# Patient Record
Sex: FEMALE | Race: WHITE | NOT HISPANIC OR LATINO | Employment: OTHER | URBAN - METROPOLITAN AREA
[De-identification: names, ages, dates, MRNs, and addresses within clinical notes are randomized per-mention and may not be internally consistent; named-entity substitution may affect disease eponyms.]

---

## 2020-07-01 PROBLEM — F32.A DEPRESSION WITH SUICIDAL IDEATION: Status: ACTIVE | Noted: 2020-07-01

## 2020-07-01 PROBLEM — R45.851 DEPRESSION WITH SUICIDAL IDEATION: Status: ACTIVE | Noted: 2020-07-01

## 2020-07-09 PROBLEM — R63.4 WEIGHT LOSS, UNINTENTIONAL: Status: ACTIVE | Noted: 2020-07-09

## 2020-07-15 PROBLEM — F22 DELUSIONAL DISORDER: Status: ACTIVE | Noted: 2020-07-15

## 2020-07-23 ENCOUNTER — OFFICE VISIT (OUTPATIENT)
Dept: NEUROLOGY | Facility: CLINIC | Age: 70
End: 2020-07-23
Payer: MEDICARE

## 2020-07-23 ENCOUNTER — LAB VISIT (OUTPATIENT)
Dept: LAB | Facility: HOSPITAL | Age: 70
End: 2020-07-23
Attending: PSYCHIATRY & NEUROLOGY
Payer: MEDICARE

## 2020-07-23 VITALS
HEART RATE: 62 BPM | HEIGHT: 65 IN | SYSTOLIC BLOOD PRESSURE: 118 MMHG | TEMPERATURE: 98 F | WEIGHT: 102.75 LBS | RESPIRATION RATE: 16 BRPM | BODY MASS INDEX: 17.12 KG/M2 | DIASTOLIC BLOOD PRESSURE: 64 MMHG

## 2020-07-23 DIAGNOSIS — R41.3 MEMORY LOSS: Primary | ICD-10-CM

## 2020-07-23 DIAGNOSIS — R41.3 MEMORY LOSS: ICD-10-CM

## 2020-07-23 DIAGNOSIS — F22 DELUSIONS: ICD-10-CM

## 2020-07-23 DIAGNOSIS — R44.3 HALLUCINATIONS: ICD-10-CM

## 2020-07-23 PROCEDURE — 3008F PR BODY MASS INDEX (BMI) DOCUMENTED: ICD-10-PCS | Mod: CPTII,S$GLB,, | Performed by: PSYCHIATRY & NEUROLOGY

## 2020-07-23 PROCEDURE — 86592 SYPHILIS TEST NON-TREP QUAL: CPT

## 2020-07-23 PROCEDURE — 36415 COLL VENOUS BLD VENIPUNCTURE: CPT

## 2020-07-23 PROCEDURE — 1126F PR PAIN SEVERITY QUANTIFIED, NO PAIN PRESENT: ICD-10-PCS | Mod: S$GLB,,, | Performed by: PSYCHIATRY & NEUROLOGY

## 2020-07-23 PROCEDURE — 3008F BODY MASS INDEX DOCD: CPT | Mod: CPTII,S$GLB,, | Performed by: PSYCHIATRY & NEUROLOGY

## 2020-07-23 PROCEDURE — 1126F AMNT PAIN NOTED NONE PRSNT: CPT | Mod: S$GLB,,, | Performed by: PSYCHIATRY & NEUROLOGY

## 2020-07-23 PROCEDURE — 82607 VITAMIN B-12: CPT

## 2020-07-23 PROCEDURE — 99999 PR PBB SHADOW E&M-EST. PATIENT-LVL IV: ICD-10-PCS | Mod: PBBFAC,,, | Performed by: PSYCHIATRY & NEUROLOGY

## 2020-07-23 PROCEDURE — 99204 OFFICE O/P NEW MOD 45 MIN: CPT | Mod: S$GLB,,, | Performed by: PSYCHIATRY & NEUROLOGY

## 2020-07-23 PROCEDURE — 1159F MED LIST DOCD IN RCRD: CPT | Mod: S$GLB,,, | Performed by: PSYCHIATRY & NEUROLOGY

## 2020-07-23 PROCEDURE — 99999 PR PBB SHADOW E&M-EST. PATIENT-LVL IV: CPT | Mod: PBBFAC,,, | Performed by: PSYCHIATRY & NEUROLOGY

## 2020-07-23 PROCEDURE — 99204 PR OFFICE/OUTPT VISIT, NEW, LEVL IV, 45-59 MIN: ICD-10-PCS | Mod: S$GLB,,, | Performed by: PSYCHIATRY & NEUROLOGY

## 2020-07-23 PROCEDURE — 1159F PR MEDICATION LIST DOCUMENTED IN MEDICAL RECORD: ICD-10-PCS | Mod: S$GLB,,, | Performed by: PSYCHIATRY & NEUROLOGY

## 2020-07-23 NOTE — LETTER
July 23, 2020      Lorraine Doyle MD  [address] [place] [address]           [facility]  [address] [address]  Phone: [phone]  Fax: [phone]          Patient: Libertad Robertson   MR Number: [id number]   YOB: 1950   Date of Visit: 7/23/2020       Dear Dr. Lorraine Doyle:    Thank you for referring Libertad Robertson to me for evaluation. Attached you will find relevant portions of my assessment and plan of care.    If you have questions, please do not hesitate to call me. I look forward to following Libertad Robertson along with you.    Sincerely,    Glen Cardozo MD    Enclosure  CC:  No Recipients    If you would like to receive this communication electronically, please contact [email or web address] or [phone] to request more information on [employer] access.    For providers and/or their staff who would like to refer a patient to [facility], please contact us through our one-stop-shop provider referral line, [facility], at [phone].    If you feel you have received this communication in error or would no longer like to receive these types of communications, please e-mail [email or web address]

## 2020-07-23 NOTE — PROGRESS NOTES
"Consult from Dr Doyle    HPI: Libertad Robertson is a 70 y.o. female with a history of memory problems  A friend here states she has been forgetful for over the past year- short term memory loss, worsening. She has had difficulty managing her banking prior as well as her phone bills.   She was having delusions that people were taking her money and food (hallucinating this/ no one was there per friend).  Has been inpatient in psychiatry for delusions which have been present for a year as well/ same as memory loss  No tremors noted. No falls.   Reviewed chart from [facility].  Her landlord reported she was having some delusions.  Inpatient she was started on Respirdal and aricept 10mg    Her delusions seem improved and no longer hallucinating on Respirdal    No prior history of mental health issues per patient  Patient' s mom had memory. Patient denies stroke or head trauma history.    Recently treated for "Gastroenteritis"- eating again more recently. Has had nausea and some diarrhea and nausea persists  Patient lives alone. Has no children.Family lives in a different town/ she is estranged from then long term.     Patient has a college degree  Patient is here with a friend from her former work who assists with meds, but patient does not have full assistance.     Not driving    Review of Systems   Unable to perform ROS: Dementia         I have reviewed all of this patient's past medical and surgical histories as well as family and social histories and active allergies and medications as documented in the electronic medical record.        Exam:  Gen Appearance, well developed/nourished in no apparent distress  CV: 2+ distal pulses with no edema or swelling  Neuro:  MS: Awake, alert, oriented to place, person, not  to time, and fully to situation. Sustains attention. Recent recall is easily forgotten/remote memory intact, Language is full to spontaneous speech/repetition/naming/comprehension. Fund of " Knowledge is full  Can't name current president (States she can picture him but can't think of the name- without hint  Has insight into memory loss now and hallucinations she was having  CN: Optic discs are flat with normal vasculature, PERRL, Extraoccular movements and visual fields are full. Normal facial sensation and strength, Hearing symmetric, Tongue and Palate are midline and strong. Shoulder Shrug symmetric and strong.  Motor: Normal bulk, tone, no abnormal movements. 5/5 strength bilateral upper/lower extremities with 1+ reflexes and no clonus  Sensory: symmetric to light touch, pain, temp, and vibration,Romberg negative  Cerebellar: Finger-nose,Heal-shin, Rapid alternating movements intact  Gait: Normal stance, no ataxia    Imagin2020 CT head  Labs: 2020CBC, CMP, TSH with PCP reviewed/ unremarkable    2020 UDS negative    Assessment/Plan: Libertad Robertson is a 70 y.o. female with a year of memory loss, executive dysfunction, and some psychiatry symptoms of hallucinations and delusions.   I recommend:   1. MRI brain  2. B12, RPR  3. Neuropsychological testing. AD, Frontal dementia and DLB all in differential diagnosis  -Recently s/p inpatient stay and [facility] for delusions. Respirdal is helping with delusions/ hallucinations  4. Reduce aricept 10mg recently started to 1/2 tablet at night given her complaint of nausea/diarrhea more recently. Stop use if this persists more than week  5. Consult home health needed for social working (needs home safety evaluation and help with long term planning), home nursing/ PT/OT (deconditioned from recent GI symptoms)  -she has no children, is estranged from family. Discussed long term planning. If hallucinations/ delusions return, she will 24 hour supervision as reviewed  -No driving . A friend here today has been helping her  RTC 6 weeks, 6 months    CC:  Dr Doyle

## 2020-07-24 LAB
RPR SER QL: NORMAL
VIT B12 SERPL-MCNC: >2000 PG/ML (ref 210–950)

## 2020-07-29 ENCOUNTER — TELEPHONE (OUTPATIENT)
Dept: NEUROLOGY | Facility: CLINIC | Age: 70
End: 2020-07-29

## 2020-07-29 NOTE — TELEPHONE ENCOUNTER
Received documentation from [facility] that they are not in network with the patients insurance for memory testing. I spoke with the patients caregiver and explained the situation and gave the phone number to [facility] to contact them to try to schedule there. Caregiver stated that she did not think the patient would go forward with it but would call us to notify if they did decide to schedule.

## 2020-07-30 ENCOUNTER — OFFICE VISIT (OUTPATIENT)
Dept: NEUROLOGY | Facility: CLINIC | Age: 70
End: 2020-07-30
Payer: MEDICARE

## 2020-07-30 VITALS
WEIGHT: 101.5 LBS | RESPIRATION RATE: 15 BRPM | BODY MASS INDEX: 16.91 KG/M2 | DIASTOLIC BLOOD PRESSURE: 76 MMHG | HEIGHT: 65 IN | HEART RATE: 65 BPM | SYSTOLIC BLOOD PRESSURE: 114 MMHG

## 2020-07-30 DIAGNOSIS — R41.3 MEMORY LOSS: Primary | ICD-10-CM

## 2020-07-30 DIAGNOSIS — R44.3 HALLUCINATIONS: ICD-10-CM

## 2020-07-30 DIAGNOSIS — Z74.8 ASSISTANCE NEEDED WITH TRANSPORTATION: ICD-10-CM

## 2020-07-30 DIAGNOSIS — F22 DELUSIONS: ICD-10-CM

## 2020-07-30 PROCEDURE — 99999 PR PBB SHADOW E&M-EST. PATIENT-LVL III: ICD-10-PCS | Mod: PBBFAC,,, | Performed by: NURSE PRACTITIONER

## 2020-07-30 PROCEDURE — 1126F PR PAIN SEVERITY QUANTIFIED, NO PAIN PRESENT: ICD-10-PCS | Mod: S$GLB,,, | Performed by: NURSE PRACTITIONER

## 2020-07-30 PROCEDURE — 1101F PT FALLS ASSESS-DOCD LE1/YR: CPT | Mod: CPTII,S$GLB,, | Performed by: NURSE PRACTITIONER

## 2020-07-30 PROCEDURE — 99214 PR OFFICE/OUTPT VISIT, EST, LEVL IV, 30-39 MIN: ICD-10-PCS | Mod: S$GLB,,, | Performed by: NURSE PRACTITIONER

## 2020-07-30 PROCEDURE — 1101F PR PT FALLS ASSESS DOC 0-1 FALLS W/OUT INJ PAST YR: ICD-10-PCS | Mod: CPTII,S$GLB,, | Performed by: NURSE PRACTITIONER

## 2020-07-30 PROCEDURE — 99214 OFFICE O/P EST MOD 30 MIN: CPT | Mod: S$GLB,,, | Performed by: NURSE PRACTITIONER

## 2020-07-30 PROCEDURE — 1126F AMNT PAIN NOTED NONE PRSNT: CPT | Mod: S$GLB,,, | Performed by: NURSE PRACTITIONER

## 2020-07-30 PROCEDURE — 99999 PR PBB SHADOW E&M-EST. PATIENT-LVL III: CPT | Mod: PBBFAC,,, | Performed by: NURSE PRACTITIONER

## 2020-07-30 PROCEDURE — 3008F PR BODY MASS INDEX (BMI) DOCUMENTED: ICD-10-PCS | Mod: CPTII,S$GLB,, | Performed by: NURSE PRACTITIONER

## 2020-07-30 PROCEDURE — 3008F BODY MASS INDEX DOCD: CPT | Mod: CPTII,S$GLB,, | Performed by: NURSE PRACTITIONER

## 2020-07-30 PROCEDURE — 1159F PR MEDICATION LIST DOCUMENTED IN MEDICAL RECORD: ICD-10-PCS | Mod: S$GLB,,, | Performed by: NURSE PRACTITIONER

## 2020-07-30 PROCEDURE — 1159F MED LIST DOCD IN RCRD: CPT | Mod: S$GLB,,, | Performed by: NURSE PRACTITIONER

## 2020-07-30 RX ORDER — ONDANSETRON 4 MG/1
4 TABLET, FILM COATED ORAL EVERY 8 HOURS PRN
COMMUNITY
End: 2020-07-30 | Stop reason: ALTCHOICE

## 2020-07-30 NOTE — PROGRESS NOTES
HPI: Libertad Robertson is a 70 y.o. female with a year of memory loss, executive dysfunction, and some psychiatry symptoms of hallucinations and delusions. Inpatient Psych admit to [facility].     She presents today for a follow up visit. She was evaluated by Dr. Cardozo one week ago for her memory complaints. This was her initial consultation. Neuropsych testing was ordered. There are issues with her having this done, as she has very little social support. Referral placed to [facility], due to insurance restrictions with a local provider. Home health was ordered at her last visit with social work consult. They are trying to arrange transportation with [place] to help with this.     Her friend accompanies her today. She has concerns whether or not patient can be relied upon to self administer medications.     Risperdal does help her mood. No hallucinations on this. Delusions are improved.     Aricept reduced by half at last visit, due to nausea/vomiting, and weight loss. This is much improved since last visit one week ago, but she continues with abdominal pain, as well as poor appetite (but improved).     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     She does not drive. She is estranged from her family.     Review of Systems   Unable to perform ROS: Dementia         I have reviewed all of this patient's past medical and surgical histories as well as family and social histories and active allergies and medications as documented in the electronic medical record.    Exam:  Gen Appearance, well developed/nourished in no apparent distress  CV: 2+ distal pulses with no edema or swelling  Neuro:  MS: Awake, alert, oriented to place, person, not  to time, and fully to situation. Sustains attention. Recent recall is easily forgotten/remote memory intact, Language is full to spontaneous speech/repetition/naming/comprehension. Fund of Knowledge is full  Can't name current president (States she  can picture him but can't think of the name- without hint)  Has insight into memory loss now and hallucinations she was having  CN: Optic discs are flat with normal vasculature, PERRL, Extraoccular movements and visual fields are full. Normal facial sensation and strength, Hearing symmetric, Tongue and Palate are midline and strong. Shoulder Shrug symmetric and strong.  Motor: Normal bulk, tone, no abnormal movements. 5/5 strength bilateral upper/lower extremities with 1+ reflexes and no clonus  Sensory: symmetric to light touch, pain, temp, and vibration,Romberg negative  Cerebellar: Finger-nose,Heal-shin, Rapid alternating movements intact  Gait: Normal stance, no ataxia    Imagin/2020 CT head    Labs:   2020 CBC, CMP, TSH with PCP reviewed/ unremarkable  2020 RPR and B12 WNL    2020 UDS negative    Assessment/Plan: Libertad Robertson is a 70 y.o. female with a year of memory loss, executive dysfunction, and some psychiatry symptoms of hallucinations and delusions.     I recommend:   1. Continue with home health with social work consult.   -Neuropsych testing is greatly needed to quantify her memory loss, and help determine if her clinical picture is more fitting of a psychiatric disorder or a type of dementia. Alzheimers, frontotemporal lobe dementia, and Lewy Body dementia are all in the differential diagnosis.   -patient has no transportation and really no significant social support. Social work consult greatly needed here.   --she has no children, is estranged from family. Discussed long term planning. If hallucinations/ delusions return, she will 24 hour supervision as reviewed  2. Stop Aricept. Nausea/vomiting resolved with reducing dose, but she continues with poor appetite and abdominal pain, which could be related.   -consider addition of Namenda pending Neuropsych testing results.   -discussed with friend that these medications do not have a significant impact on memory and that stopping  Aricept would not cause any abrupt changes in mental status.   3. She is pending MRI Brain this week.   4. Recently s/p inpatient stay and [facility] for delusions. Risperdal is helping with delusions/ hallucinations  5. Consult home health needed for social working (needs home safety evaluation and help with long term planning), home nursing/ PT/OT (deconditioned from recent GI symptoms)  6. No driving.     RTC 6 weeks, RTC 6 months    CC:    Dr. Doyle  The Medical Team

## 2020-07-31 ENCOUNTER — HOSPITAL ENCOUNTER (OUTPATIENT)
Dept: RADIOLOGY | Facility: HOSPITAL | Age: 70
Discharge: HOME | End: 2020-07-31
Attending: PSYCHIATRY & NEUROLOGY
Payer: MEDICARE

## 2020-07-31 DIAGNOSIS — R41.3 MEMORY LOSS: ICD-10-CM

## 2020-07-31 DIAGNOSIS — R44.3 HALLUCINATIONS: ICD-10-CM

## 2020-07-31 DIAGNOSIS — F22 DELUSIONS: ICD-10-CM

## 2020-07-31 PROCEDURE — 70551 MRI BRAIN STEM W/O DYE: CPT | Mod: 26,,, | Performed by: RADIOLOGY

## 2020-07-31 PROCEDURE — 70551 MRI BRAIN WITHOUT CONTRAST: ICD-10-PCS | Mod: 26,,, | Performed by: RADIOLOGY

## 2020-07-31 PROCEDURE — 70551 MRI BRAIN STEM W/O DYE: CPT | Mod: TC

## 2020-08-07 ENCOUNTER — EXTERNAL HOME HEALTH (OUTPATIENT)
Dept: HOME HEALTH SERVICES | Facility: HOSPITAL | Age: 70
End: 2020-08-07

## 2020-08-17 ENCOUNTER — DOCUMENT SCAN (OUTPATIENT)
Dept: HOME HEALTH SERVICES | Facility: HOSPITAL | Age: 70
End: 2020-08-17
Payer: MEDICARE

## 2020-08-20 ENCOUNTER — SOCIAL WORK (OUTPATIENT)
Dept: CASE MANAGEMENT | Facility: HOSPITAL | Age: 70
End: 2020-08-20

## 2020-08-20 NOTE — PROGRESS NOTES
spoke to Sheryl, a representative with [employer], regarding transportation assistance for patient.  was informed that patient does not have transportation services as part of her plan and is not eligible for assistance.      spoke to the [facility] regarding potential transportation assistance for patient.  was informed that they only transport within [facility].     Clinic notified of information.

## 2020-08-27 ENCOUNTER — TELEPHONE (OUTPATIENT)
Dept: NEUROLOGY | Facility: CLINIC | Age: 70
End: 2020-08-27

## 2020-08-27 NOTE — TELEPHONE ENCOUNTER
Spoke with family friend and patient was scheduled for 30 min clinic visit. Patient's f/u with NP was canceled.

## 2020-09-15 ENCOUNTER — TELEPHONE (OUTPATIENT)
Dept: NEUROLOGY | Facility: CLINIC | Age: 70
End: 2020-09-15

## 2020-09-15 ENCOUNTER — OFFICE VISIT (OUTPATIENT)
Dept: NEUROLOGY | Facility: CLINIC | Age: 70
End: 2020-09-15
Payer: MEDICARE

## 2020-09-15 VITALS
TEMPERATURE: 97 F | RESPIRATION RATE: 14 BRPM | BODY MASS INDEX: 17.33 KG/M2 | HEART RATE: 48 BPM | SYSTOLIC BLOOD PRESSURE: 164 MMHG | WEIGHT: 107.81 LBS | HEIGHT: 66 IN | DIASTOLIC BLOOD PRESSURE: 74 MMHG

## 2020-09-15 DIAGNOSIS — G30.1 LATE ONSET ALZHEIMER'S DISEASE WITH BEHAVIORAL DISTURBANCE: Primary | ICD-10-CM

## 2020-09-15 DIAGNOSIS — F02.818 LATE ONSET ALZHEIMER'S DISEASE WITH BEHAVIORAL DISTURBANCE: Primary | ICD-10-CM

## 2020-09-15 PROCEDURE — 1126F PR PAIN SEVERITY QUANTIFIED, NO PAIN PRESENT: ICD-10-PCS | Mod: S$GLB,,, | Performed by: PSYCHIATRY & NEUROLOGY

## 2020-09-15 PROCEDURE — 1159F PR MEDICATION LIST DOCUMENTED IN MEDICAL RECORD: ICD-10-PCS | Mod: S$GLB,,, | Performed by: PSYCHIATRY & NEUROLOGY

## 2020-09-15 PROCEDURE — 1126F AMNT PAIN NOTED NONE PRSNT: CPT | Mod: S$GLB,,, | Performed by: PSYCHIATRY & NEUROLOGY

## 2020-09-15 PROCEDURE — 1101F PT FALLS ASSESS-DOCD LE1/YR: CPT | Mod: CPTII,S$GLB,, | Performed by: PSYCHIATRY & NEUROLOGY

## 2020-09-15 PROCEDURE — 99999 PR PBB SHADOW E&M-EST. PATIENT-LVL III: CPT | Mod: PBBFAC,,, | Performed by: PSYCHIATRY & NEUROLOGY

## 2020-09-15 PROCEDURE — 99999 PR PBB SHADOW E&M-EST. PATIENT-LVL III: ICD-10-PCS | Mod: PBBFAC,,, | Performed by: PSYCHIATRY & NEUROLOGY

## 2020-09-15 PROCEDURE — 99215 OFFICE O/P EST HI 40 MIN: CPT | Mod: S$GLB,,, | Performed by: PSYCHIATRY & NEUROLOGY

## 2020-09-15 PROCEDURE — 99215 PR OFFICE/OUTPT VISIT, EST, LEVL V, 40-54 MIN: ICD-10-PCS | Mod: S$GLB,,, | Performed by: PSYCHIATRY & NEUROLOGY

## 2020-09-15 PROCEDURE — 3008F PR BODY MASS INDEX (BMI) DOCUMENTED: ICD-10-PCS | Mod: CPTII,S$GLB,, | Performed by: PSYCHIATRY & NEUROLOGY

## 2020-09-15 PROCEDURE — 3008F BODY MASS INDEX DOCD: CPT | Mod: CPTII,S$GLB,, | Performed by: PSYCHIATRY & NEUROLOGY

## 2020-09-15 PROCEDURE — 1159F MED LIST DOCD IN RCRD: CPT | Mod: S$GLB,,, | Performed by: PSYCHIATRY & NEUROLOGY

## 2020-09-15 PROCEDURE — 1101F PR PT FALLS ASSESS DOC 0-1 FALLS W/OUT INJ PAST YR: ICD-10-PCS | Mod: CPTII,S$GLB,, | Performed by: PSYCHIATRY & NEUROLOGY

## 2020-09-15 RX ORDER — MEMANTINE HYDROCHLORIDE 5 MG/1
TABLET ORAL
Qty: 60 TABLET | Refills: 11 | Status: SHIPPED | OUTPATIENT
Start: 2020-09-15

## 2020-09-15 NOTE — PROGRESS NOTES
Consult from Dr Doyle    HPI: Libertad Robertson is a 70 y.o. female     Here for follow up   I asked her to come in for more extensive testing of her memory as she can't get to neuropsychological testing which is not in network locally, can't get transportation to out of town testings.    She was an hour late for this appointment.     No longer in home health, was discharged    MEDS are supervised by her friend who checks and reminds her about them daily  Not driving  Friend helps her with bills  Not clearly having hallucinations.    She is able to do her own laundry. Friend has to make sure she has her correct groceries and food.      Patient has a college degree  Patient is here with a friend from her former work who assists with meds, but patient does not have full assistance.           Review of Systems   Unable to perform ROS: Dementia         I have reviewed all of this patient's past medical and surgical histories as well as family and social histories and active allergies and medications as documented in the electronic medical record.        Exam:  Gen Appearance, well developed/nourished in no apparent distress  CV: 2+ distal pulses with no edema or swelling  Neuro:  MS: See [facility] today  She has no insight into her memory loss  CN: Optic discs are flat with normal vasculature, PERRL, Extraoccular movements and visual fields are full. Normal facial sensation and strength, Hearing symmetric, Tongue and Palate are midline and strong. Shoulder Shrug symmetric and strong.  Motor: Normal bulk, tone, no abnormal movements. 5/5 strength bilateral upper/lower extremities with 1+ reflexes and no clonus  Sensory: symmetric to light touch, pain, temp, and vibration,Romberg negative  Cerebellar: Finger-nose,Heal-shin, Rapid alternating movements intact  Gait: Normal stance, no ataxia    Imagin2020 CT head   MRI brain noncontributory   Labs: 2020CBC, CMP, TSH with PCP reviewed/ unremarkable   B12, RPR  unremarkable    7/2020 UDS negative    Assessment/Plan: Libertad Robertson is a 70 y.o. female with a year of memory loss, executive dysfunction, and some psychiatry symptoms of hallucinations and delusions.   I recommend:   1. MRI brain and labs noncontributory   3. Neuropsychological testing needed, but patient is out of network for local providers and can't get transportation for in network providers which are distant to her home.   Note: . AD, Frontal dementia and DLB all in differential diagnosis  Score on yoel cognitive assessment today is 9/30 suggesting significant dementia. Likely Alzheimer typed based on deficits seen.  -Recently s/p inpatient stay and [facility] for delusions. Respirdal is helping with delusions/ hallucinations and should  be continued  -Completed home health in 2020  -ADULT PROTECTIVE SERVICES consult needed/ will be requested by our clinic. She has a progressive condition and needs a well check and future planning help  which her friend is unable to provide full time. She needs 23 hour supervision my opinion and has refused placement.   -she has no children, is estranged from family. Discussed long term planning with friend prior. If hallucinations/ delusions return, she will need immediate supervision if so as discussed with her friend today- dial 911.  Progressive nature of illness reviewed.  She has no insight into her memory loss and needs assistance 24/7   4. Stopped Aricept due to GI complaint with this.   -Trial of Namenda instead per orders  5. No driving . A friend here at visits has been helping her  RTC 4 months    Time spent on this encounter was 40 minutes with more than 1/2 of that time in patient and family counseling of the above topics listed in recommendations

## 2020-09-15 NOTE — TELEPHONE ENCOUNTER
Patient no showed a scheduled for an extended visit with me today  to try to quantify her memory loss better as she could not get transportation to neuropsychological testing.   Please do a well check on patient  (Via her friend Melanie who is listed as her contact in the chart if needed).  If there are immediate concerns for this patient's safety or well being, please place an adult protected services consult.

## 2020-09-18 ENCOUNTER — TELEPHONE (OUTPATIENT)
Dept: NEUROLOGY | Facility: CLINIC | Age: 70
End: 2020-09-18

## 2020-09-18 NOTE — TELEPHONE ENCOUNTER
Spoke with Rebecca Hagan with [facility] [employer] [facility]. Explained the need for intervention for this patient per Dr Cardozo. Clinicals faxed to Rebecca at [phone].

## 2020-10-16 ENCOUNTER — TELEPHONE (OUTPATIENT)
Dept: NEUROLOGY | Facility: CLINIC | Age: 70
End: 2020-10-16

## 2020-10-16 NOTE — TELEPHONE ENCOUNTER
"Spoke with patient's friend, Melanie, for over 30 minutes over the phone. Friend states that Libertad has been asking for her keys. Friend states that she does not legally have the right to keep her keys and she does not know what to do or continue to say to Libertad each time she asks. I did advise the danger in her driving and the last note per Dr. Cardozo stated no driving and it was also discussed in the visit. Friend states that she is having to repeat herself again an again about the keys when Libertad asks for them, I did explain that she may not remember or is unsure why the keys were taken. She may think that everything is okay and she is capable of driving. Friend states that she does and is constantly asking for the keys . One day she asked the friend for the keys and when she told her she would be coming to drive her to the store, she found Libertad on the side of the road walking to the grocery store, when she asked her why she was walking Libertad told her it was because she wasn't sure where the keys to the car where. She states that when she explains to her about no driving she becomes annoyed and states, "what am I suppose to do let my car rot?" I advised to the friend she is doing what is right by protecting her an others.     Friend states that she can not continue to care for Libertad. She states that she is "not suppose to be her medical care taker. It is not suppose to be her responsibility." She states that when Libertad becomes upset with her about anything including the keys she says to Libertad that if she drives or does not listen to the doctors that she will not help Libertad or speak to her anymore. I did advise to her that she may not fully understand all of this and it may be more upsetting to her. Friend stated that she is aware but she is unsure what to do anymore. She stated several times that she does not feel like she should be taking care of Libertad and that she can not take care of her medical " "things. She stated that she needed help and she needed someone else to help. I did advise at the last visit it was discussed about placement for Libertad, however the friend did repeat that Libertad could not go into a home due to "what she has in the bank". Afterwards I did mention that at this point with her having no family she may want to look past the fact of the money she has " in the bank", friend states that she is not the one who is concerned with this but it is Libertad herself.     Friend states that she has not heard from the Adult Protective services and has had no outside help. I did advise that I will contact them again and see where the status is on her case. Friend states that she was given names of facilities such a [facility] and  [facility]. I advised that she reach out to these places for any insight and help for Libertad. I also advised that she may want to reach out to PCP to set up an appointment to discuss possible home placement rather nursing home or assisted living this would need to start with [facility], friend did voiced understanding.  I advised she started with the Human Resource center and see what they offer, as these people will be able to guide her with what else is available near them. Friend also states that Libertad is having to possibly change her insurance and she has no idea who will heal or guide her to make the decision on what to change. I advised again they reach out to the Human resources.     I also reached out to Adult Protective Services and was given a contact fr someone working on her case, Dallinabhinav [phone]. I contacted her and left a VM for call back.     Just an FYI  "

## 2020-10-16 NOTE — TELEPHONE ENCOUNTER
----- Message from Lexi Dick sent at 10/16/2020  9:57 AM CDT -----  Regarding: Melanie-Friend  Libertad Robertson  MRN: [id number]  : 1950  PCP: Lorraine Doyle  Home Phone      [phone]  Work Phone      Not on file.  Mobile          [phone]      MESSAGE:   Pts friend has questions regarding the pts care, pt is wanting to drive and states she feels like a prisoner in her home. Please return call @ [phone]. Thanks.

## 2020-10-19 NOTE — TELEPHONE ENCOUNTER
Thanks for the good documentaion. Agree with advice given.  Please contact the DMV to request info on  how we can request suspension this patient's driving privileges.   It may be a form vs letter that they need us to fill out.  This may help protect the patient's friend.  The patient likely needs a guardian appointment, but for that we need the assistance of APS.

## 2020-10-23 NOTE — TELEPHONE ENCOUNTER
Spoke with Hailee with Adult Protected Services. She states that they have set up for evaluation today for Libertad. She states she had a very hard time contacting Libertad. I advised they contact her friend, Melanie, as this is who comes to her appointments with her and also the contact for Libertad.     Just an FYI    DMV form to without patient license placed on your desk for review and completion

## 2020-10-27 ENCOUNTER — OFFICE VISIT (OUTPATIENT)
Dept: NEUROLOGY | Facility: CLINIC | Age: 70
End: 2020-10-27
Payer: MEDICARE

## 2020-10-27 VITALS
TEMPERATURE: 98 F | BODY MASS INDEX: 19.5 KG/M2 | HEIGHT: 64 IN | HEART RATE: 78 BPM | WEIGHT: 114.19 LBS | DIASTOLIC BLOOD PRESSURE: 60 MMHG | RESPIRATION RATE: 14 BRPM | SYSTOLIC BLOOD PRESSURE: 108 MMHG

## 2020-10-27 DIAGNOSIS — F03.90 DEMENTIA WITHOUT BEHAVIORAL DISTURBANCE, UNSPECIFIED DEMENTIA TYPE: Primary | ICD-10-CM

## 2020-10-27 DIAGNOSIS — Z65.8 INADEQUATE SOCIAL SUPPORT: ICD-10-CM

## 2020-10-27 PROCEDURE — 1159F PR MEDICATION LIST DOCUMENTED IN MEDICAL RECORD: ICD-10-PCS | Mod: S$GLB,,, | Performed by: NURSE PRACTITIONER

## 2020-10-27 PROCEDURE — 3008F PR BODY MASS INDEX (BMI) DOCUMENTED: ICD-10-PCS | Mod: CPTII,S$GLB,, | Performed by: NURSE PRACTITIONER

## 2020-10-27 PROCEDURE — 99215 PR OFFICE/OUTPT VISIT, EST, LEVL V, 40-54 MIN: ICD-10-PCS | Mod: S$GLB,,, | Performed by: NURSE PRACTITIONER

## 2020-10-27 PROCEDURE — 3008F BODY MASS INDEX DOCD: CPT | Mod: CPTII,S$GLB,, | Performed by: NURSE PRACTITIONER

## 2020-10-27 PROCEDURE — 1101F PR PT FALLS ASSESS DOC 0-1 FALLS W/OUT INJ PAST YR: ICD-10-PCS | Mod: CPTII,S$GLB,, | Performed by: NURSE PRACTITIONER

## 2020-10-27 PROCEDURE — 99215 OFFICE O/P EST HI 40 MIN: CPT | Mod: S$GLB,,, | Performed by: NURSE PRACTITIONER

## 2020-10-27 PROCEDURE — 99999 PR PBB SHADOW E&M-EST. PATIENT-LVL IV: ICD-10-PCS | Mod: PBBFAC,,, | Performed by: NURSE PRACTITIONER

## 2020-10-27 PROCEDURE — 1126F AMNT PAIN NOTED NONE PRSNT: CPT | Mod: S$GLB,,, | Performed by: NURSE PRACTITIONER

## 2020-10-27 PROCEDURE — 99999 PR PBB SHADOW E&M-EST. PATIENT-LVL IV: CPT | Mod: PBBFAC,,, | Performed by: NURSE PRACTITIONER

## 2020-10-27 PROCEDURE — 1101F PT FALLS ASSESS-DOCD LE1/YR: CPT | Mod: CPTII,S$GLB,, | Performed by: NURSE PRACTITIONER

## 2020-10-27 PROCEDURE — 1159F MED LIST DOCD IN RCRD: CPT | Mod: S$GLB,,, | Performed by: NURSE PRACTITIONER

## 2020-10-27 PROCEDURE — 1126F PR PAIN SEVERITY QUANTIFIED, NO PAIN PRESENT: ICD-10-PCS | Mod: S$GLB,,, | Performed by: NURSE PRACTITIONER

## 2020-10-27 NOTE — PROGRESS NOTES
HPI: Libertad Robertson is a 70 y.o. female with evidence for AD, unable to quantify/evaluate further with memory testing, due to lack of transportation.     She presents today to discuss prognosis at the recommendation of her friend, as she doesn't understand this. She discusses how upset she is throughout visit over inability to drive, and states that she will drive anyway. She feels as if her freedom has been taken away from her. She states that she does not like to be alone, and that she feels like a prisoner.     Reviewed phone messages with friend, who relates lack of social resources, and inability of patient to care for self. She was referred to multiple resources, which her friend has not contacted. Adult protective services was contacted, who was unable to contact patient.     She had home health prior. Unclear if social work consult was made.     Friend checks on patient nearly every day, but if she misses a day, patient becomes very depressed. Her friend is concerned about what may happen if she is unable to assist her.     She is asking that this clinic coordinate assistance for patient at home.     She is not currently driving. Friend helps her with bills. Not clearly having hallucinations.    She is able to do her own laundry. Friend has to make sure she has her correct groceries and food.    Patient has a college degree, and worked as a Radiology tech previously.     Patient is here with a friend from her former work who assists with meds, but patient does not have full assistance.     Review of Systems   Unable to perform ROS: Dementia       I have reviewed all of this patient's past medical and surgical histories as well as family and social histories and active allergies and medications as documented in the electronic medical record.    Exam:  Gen Appearance, well developed/nourished in no apparent distress  CV: 2+ distal pulses with no edema or swelling  Neuro:  MS: See [facility] today  She has no  insight into her memory loss. Clock drawing was very poor.   CN: Optic discs are flat with normal vasculature, PERRL, Extraoccular movements and visual fields are full. Normal facial sensation and strength, Hearing symmetric, Tongue and Palate are midline and strong. Shoulder Shrug symmetric and strong.  Motor: Normal bulk, tone, no abnormal movements. 5/5 strength bilateral upper/lower extremities with 1+ reflexes and no clonus  Sensory: symmetric to light touch, pain, temp, and vibration,Romberg negative  Cerebellar: Finger-nose,Heal-shin, Rapid alternating movements intact  Gait: Normal stance, no ataxia    Imagin/2020 CT head   MRI brain noncontributory     Labs:   2020CBC, CMP, TSH with PCP reviewed/ unremarkable   B12, RPR unremarkable    2020 UDS negative    Assessment/Plan: Liebrtad Robertson is a 70 y.o. female with a year of memory loss, executive dysfunction, and some psychiatry symptoms of hallucinations and delusions.     I recommend:   1. MRI Brain and labs noncontributory.   2. Neuropsychological testing needed, but patient is out of network for local providers and can't get transportation for in network providers which are distant to her home.   Note: AD, Frontal dementia, and DLB all in differential diagnosis.  -Score on yoel cognitive assessment is 9/30 suggesting significant dementia. Likely Alzheimer typed based on deficits seen.  -Recently s/p inpatient stay and [facility] for delusions. Risperdal is helping with delusions/ hallucinations and should be continued  -Completed home health in .  -Resources shared with friend: [facility], [facility], [place] [facility]  3. Will refer to home health again for safety concerns, given lack of social resources.   -SOCIAL WORK CONSULT NECESSARY. Nursing home placement should be considered, as she does not appear to be able to care for herself.   4. ADULT PROTECTIVE SERVICES consult  done per this clinic prior, though they were unable to reach patient.    -She has a progressive condition and needs a well check and future planning help which her friend is unable to provide full time.   -She needs 24 hour supervision my opinion and has refused placement.   -she has no children, is estranged from family. Discussed long term planning with friend prior.   -If hallucinations/ delusions return, she will need immediate supervision if so as discussed with her friend today- dial 911.  -Progressive nature of illness reviewed.  -She has no insight into her memory loss and needs assistance 24/7.   5. Stopped Aricept due to GI complaint with this.   -Continue Namenda.   6. No driving. A friend here at visits has been helping her.     RTC 4 months    Time spent on this encounter was 40 minutes with more than 1/2 of that time in patient and family counseling of the above topics listed in recommendations

## 2020-10-27 NOTE — PATIENT INSTRUCTIONS
Community Resources include:     [facility]   [facility]  [place] [facility]

## 2020-11-02 PROCEDURE — G0180 MD CERTIFICATION HHA PATIENT: HCPCS | Mod: ,,, | Performed by: PSYCHIATRY & NEUROLOGY

## 2020-11-02 PROCEDURE — G0180 PR HOME HEALTH MD CERTIFICATION: ICD-10-PCS | Mod: ,,, | Performed by: PSYCHIATRY & NEUROLOGY

## 2020-11-11 ENCOUNTER — TELEPHONE (OUTPATIENT)
Dept: NEUROLOGY | Facility: CLINIC | Age: 70
End: 2020-11-11

## 2020-11-11 NOTE — TELEPHONE ENCOUNTER
Friend- Melanie, is going to drop paper work off to office to be filled out. Melanie also states that pt has been having light headed spells and states that she does not believe pt is taking medicines correctly and forgetting to eat meals. Instructed her that she should try to set alarms to remind pt to take medicines and to eat. Wanted to inform providers what was going on. Please advise.

## 2020-11-11 NOTE — TELEPHONE ENCOUNTER
Patient does not appear to be able to care for herself. Can we call Adult Protective Services again to try to see this patient? We are limited in what we can do about this, otherwise.

## 2020-11-11 NOTE — TELEPHONE ENCOUNTER
----- Message from Bernie Plasencia sent at 2020 12:21 PM CST -----  Contact: Melanie/Friend  Libertad Robertson  MRN: [id number]  : 1950  PCP: Lorraine Doyle  Home Phone      [phone]  Work Phone      Not on file.  Mobile          [phone]      MESSAGE:     Would like to speak to nurse regarding a medical examination form that she received from the [place] [employer] that needs to be filled out.  Please call to discuss and advise.    Phone: [phone]

## 2020-11-12 ENCOUNTER — EXTERNAL HOME HEALTH (OUTPATIENT)
Dept: HOME HEALTH SERVICES | Facility: HOSPITAL | Age: 70
End: 2020-11-12
Payer: MEDICARE

## 2020-11-12 NOTE — TELEPHONE ENCOUNTER
Agree with reaching out to [employer]. Please call them again. Perhaps we can request that this friend have someone from that agency to speak to when she has these concerns?    Also, what paperwork from the DMV?    Please note, I already completed a form from the DMV for request to suspend this patient's driving.

## 2020-11-12 NOTE — TELEPHONE ENCOUNTER
Spoke with adult services, they took a report of everything the friend jeremy gave. Stated that they will follow up with pt and jeremy.

## 2020-11-17 ENCOUNTER — DOCUMENT SCAN (OUTPATIENT)
Dept: HOME HEALTH SERVICES | Facility: HOSPITAL | Age: 70
End: 2020-11-17
Payer: MEDICARE

## 2021-01-22 ENCOUNTER — TELEPHONE (OUTPATIENT)
Dept: NEUROLOGY | Facility: CLINIC | Age: 71
End: 2021-01-22

## 2021-01-25 ENCOUNTER — OFFICE VISIT (OUTPATIENT)
Dept: NEUROLOGY | Facility: CLINIC | Age: 71
End: 2021-01-25
Payer: MEDICARE

## 2021-01-25 VITALS
HEIGHT: 65 IN | SYSTOLIC BLOOD PRESSURE: 130 MMHG | DIASTOLIC BLOOD PRESSURE: 68 MMHG | BODY MASS INDEX: 19.36 KG/M2 | TEMPERATURE: 97 F | WEIGHT: 116.19 LBS | HEART RATE: 56 BPM | RESPIRATION RATE: 14 BRPM

## 2021-01-25 DIAGNOSIS — R42 LIGHT HEADEDNESS: ICD-10-CM

## 2021-01-25 DIAGNOSIS — Z65.8 INADEQUATE SOCIAL SUPPORT: ICD-10-CM

## 2021-01-25 DIAGNOSIS — F03.90 DEMENTIA WITHOUT BEHAVIORAL DISTURBANCE, UNSPECIFIED DEMENTIA TYPE: Primary | ICD-10-CM

## 2021-01-25 PROCEDURE — 3008F PR BODY MASS INDEX (BMI) DOCUMENTED: ICD-10-PCS | Mod: CPTII,S$GLB,, | Performed by: PSYCHIATRY & NEUROLOGY

## 2021-01-25 PROCEDURE — 1126F AMNT PAIN NOTED NONE PRSNT: CPT | Mod: S$GLB,,, | Performed by: PSYCHIATRY & NEUROLOGY

## 2021-01-25 PROCEDURE — 1126F PR PAIN SEVERITY QUANTIFIED, NO PAIN PRESENT: ICD-10-PCS | Mod: S$GLB,,, | Performed by: PSYCHIATRY & NEUROLOGY

## 2021-01-25 PROCEDURE — 99999 PR PBB SHADOW E&M-EST. PATIENT-LVL III: CPT | Mod: PBBFAC,,, | Performed by: PSYCHIATRY & NEUROLOGY

## 2021-01-25 PROCEDURE — 99214 OFFICE O/P EST MOD 30 MIN: CPT | Mod: S$GLB,,, | Performed by: PSYCHIATRY & NEUROLOGY

## 2021-01-25 PROCEDURE — 1159F PR MEDICATION LIST DOCUMENTED IN MEDICAL RECORD: ICD-10-PCS | Mod: S$GLB,,, | Performed by: PSYCHIATRY & NEUROLOGY

## 2021-01-25 PROCEDURE — 99214 PR OFFICE/OUTPT VISIT, EST, LEVL IV, 30-39 MIN: ICD-10-PCS | Mod: S$GLB,,, | Performed by: PSYCHIATRY & NEUROLOGY

## 2021-01-25 PROCEDURE — 1159F MED LIST DOCD IN RCRD: CPT | Mod: S$GLB,,, | Performed by: PSYCHIATRY & NEUROLOGY

## 2021-01-25 PROCEDURE — 3008F BODY MASS INDEX DOCD: CPT | Mod: CPTII,S$GLB,, | Performed by: PSYCHIATRY & NEUROLOGY

## 2021-01-25 PROCEDURE — 99999 PR PBB SHADOW E&M-EST. PATIENT-LVL III: ICD-10-PCS | Mod: PBBFAC,,, | Performed by: PSYCHIATRY & NEUROLOGY

## 2021-03-01 ENCOUNTER — TELEPHONE (OUTPATIENT)
Dept: NEUROLOGY | Facility: CLINIC | Age: 71
End: 2021-03-01

## 2021-03-12 ENCOUNTER — TELEPHONE (OUTPATIENT)
Dept: NEUROLOGY | Facility: CLINIC | Age: 71
End: 2021-03-12

## 2021-04-13 ENCOUNTER — TELEPHONE (OUTPATIENT)
Dept: NEUROLOGY | Facility: CLINIC | Age: 71
End: 2021-04-13

## 2021-06-17 ENCOUNTER — OFFICE VISIT (OUTPATIENT)
Dept: NEUROLOGY | Facility: CLINIC | Age: 71
End: 2021-06-17
Payer: MEDICARE

## 2021-06-17 VITALS
OXYGEN SATURATION: 100 % | WEIGHT: 111.75 LBS | RESPIRATION RATE: 16 BRPM | HEART RATE: 65 BPM | HEIGHT: 65 IN | BODY MASS INDEX: 18.62 KG/M2 | SYSTOLIC BLOOD PRESSURE: 128 MMHG | DIASTOLIC BLOOD PRESSURE: 62 MMHG

## 2021-06-17 DIAGNOSIS — F22 DELUSIONAL DISORDER: ICD-10-CM

## 2021-06-17 DIAGNOSIS — F22 DELUSIONS: ICD-10-CM

## 2021-06-17 DIAGNOSIS — R41.3 MEMORY LOSS: Primary | ICD-10-CM

## 2021-06-17 DIAGNOSIS — R44.3 HALLUCINATIONS: ICD-10-CM

## 2021-06-17 PROBLEM — Z74.8 ASSISTANCE NEEDED WITH TRANSPORTATION: Status: RESOLVED | Noted: 2020-07-30 | Resolved: 2021-06-17

## 2021-06-17 PROCEDURE — 99999 PR PBB SHADOW E&M-EST. PATIENT-LVL III: ICD-10-PCS | Mod: PBBFAC,,, | Performed by: NURSE PRACTITIONER

## 2021-06-17 PROCEDURE — 99213 OFFICE O/P EST LOW 20 MIN: CPT | Mod: PBBFAC | Performed by: NURSE PRACTITIONER

## 2021-06-17 PROCEDURE — 99213 OFFICE O/P EST LOW 20 MIN: CPT | Mod: S$PBB | Performed by: NURSE PRACTITIONER

## 2021-06-17 PROCEDURE — 99999 PR PBB SHADOW E&M-EST. PATIENT-LVL III: CPT | Mod: PBBFAC,,, | Performed by: NURSE PRACTITIONER

## 2021-06-17 PROCEDURE — 99999 PR STA SHADOW: CPT | Mod: PBBFAC,,, | Performed by: NURSE PRACTITIONER

## 2021-06-17 RX ORDER — MELOXICAM 15 MG/1
15 TABLET ORAL DAILY
COMMUNITY
Start: 2021-06-15